# Patient Record
Sex: MALE | Race: WHITE | NOT HISPANIC OR LATINO | Employment: FULL TIME | URBAN - METROPOLITAN AREA
[De-identification: names, ages, dates, MRNs, and addresses within clinical notes are randomized per-mention and may not be internally consistent; named-entity substitution may affect disease eponyms.]

---

## 2023-11-10 ENCOUNTER — MEDICAL CORRESPONDENCE (OUTPATIENT)
Dept: HEALTH INFORMATION MANAGEMENT | Facility: CLINIC | Age: 63
End: 2023-11-10
Payer: COMMERCIAL

## 2023-11-14 ENCOUNTER — TRANSCRIBE ORDERS (OUTPATIENT)
Dept: OTHER | Age: 63
End: 2023-11-14

## 2023-11-14 DIAGNOSIS — H53.2 DOUBLE VISION: Primary | ICD-10-CM

## 2023-12-11 ENCOUNTER — OFFICE VISIT (OUTPATIENT)
Dept: OPHTHALMOLOGY | Facility: CLINIC | Age: 63
End: 2023-12-11
Attending: STUDENT IN AN ORGANIZED HEALTH CARE EDUCATION/TRAINING PROGRAM
Payer: COMMERCIAL

## 2023-12-11 DIAGNOSIS — H52.203 MYOPIA OF BOTH EYES WITH ASTIGMATISM AND PRESBYOPIA: ICD-10-CM

## 2023-12-11 DIAGNOSIS — Z96.1 PSEUDOPHAKIA, BOTH EYES: ICD-10-CM

## 2023-12-11 DIAGNOSIS — H52.4 MYOPIA OF BOTH EYES WITH ASTIGMATISM AND PRESBYOPIA: ICD-10-CM

## 2023-12-11 DIAGNOSIS — H51.11 CONVERGENCE INSUFFICIENCY: Primary | ICD-10-CM

## 2023-12-11 DIAGNOSIS — H52.13 MYOPIA OF BOTH EYES WITH ASTIGMATISM AND PRESBYOPIA: ICD-10-CM

## 2023-12-11 PROCEDURE — 92060 SENSORIMOTOR EXAMINATION: CPT | Performed by: STUDENT IN AN ORGANIZED HEALTH CARE EDUCATION/TRAINING PROGRAM

## 2023-12-11 PROCEDURE — 99214 OFFICE O/P EST MOD 30 MIN: CPT | Performed by: STUDENT IN AN ORGANIZED HEALTH CARE EDUCATION/TRAINING PROGRAM

## 2023-12-11 PROCEDURE — 92004 COMPRE OPH EXAM NEW PT 1/>: CPT | Performed by: STUDENT IN AN ORGANIZED HEALTH CARE EDUCATION/TRAINING PROGRAM

## 2023-12-11 ASSESSMENT — TONOMETRY
OS_IOP_MMHG: 10
IOP_METHOD: TONOPEN
OD_IOP_MMHG: 10

## 2023-12-11 ASSESSMENT — REFRACTION_MANIFEST
OS_ADD: +2.50
OD_ADD: +2.50
OD_AXIS: 011
OS_AXIS: 041
OD_SPHERE: PLANO
OD_CYLINDER: +0.50
OS_CYLINDER: +0.25
OS_SPHERE: -1.00

## 2023-12-11 ASSESSMENT — CONF VISUAL FIELD
OS_NORMAL: 1
OD_INFERIOR_NASAL_RESTRICTION: 0
OS_SUPERIOR_TEMPORAL_RESTRICTION: 0
OS_INFERIOR_TEMPORAL_RESTRICTION: 0
OD_NORMAL: 1
OS_INFERIOR_NASAL_RESTRICTION: 0
OS_SUPERIOR_NASAL_RESTRICTION: 0
OD_INFERIOR_TEMPORAL_RESTRICTION: 0
OD_SUPERIOR_NASAL_RESTRICTION: 0
OD_SUPERIOR_TEMPORAL_RESTRICTION: 0

## 2023-12-11 ASSESSMENT — EXTERNAL EXAM - RIGHT EYE: OD_EXAM: WNL

## 2023-12-11 ASSESSMENT — VISUAL ACUITY
METHOD: SNELLEN - LINEAR
OS_PH_SC: 20/25
OS_SC: 20/40
OD_SC: 20/20

## 2023-12-11 ASSESSMENT — CUP TO DISC RATIO
OD_RATIO: 0.1
OS_RATIO: 0.1

## 2023-12-11 ASSESSMENT — EXTERNAL EXAM - LEFT EYE: OS_EXAM: WNL

## 2023-12-11 ASSESSMENT — SLIT LAMP EXAM - LIDS
COMMENTS: WNL
COMMENTS: WNL

## 2023-12-11 NOTE — LETTER
12/11/2023       RE: Jeremiah Lerner  [address] [place] [address] 8 [facility] [address]     Dear Colleague,    Thank you for referring your patient, Jeremiah Lerner, to the [facility] [place] at [facility]. Please see a copy of my visit note below.    HPI       Diplopia Evaluation    In both eyes.  Duration of 1 year.             Comments    Pt. States that he has had intermittent double vision for the last year. Had CE/IOL LE 3 weeks ago and double vision seems to be resolved. Felt as though eyes are not fusing together. No pain or dryness BE. No flashes or floaters BE.   Pamela Delvalle COT 9:16 AM December 11, 2023             Last edited by Jojo Gregory MD on 12/12/2023  9:49 AM.          Review of systems for the eyes was negative other than the pertinent positives/negatives listed in the HPI.    Ocular Meds: nepofenac/moxfloxcain/predforte BID OS    Ocular Hx: pseudophakia OU    FOHx: no family history of glaucoma or blindness    PMHx: HLD, allergic rhinitis    MRI Head without and with Contrast 6/29/23  IMPRESSION:   1. No acute intracranial process.  No ischemia, hemorrhage, or mass.   2. Mild tortuosity/laxity of the optic nerves of uncertain significance.  The   globes and orbital structures are otherwise normal.   3. Mild chronic generalized atrophy and mild chronic white matter microvascular   disease.     Assessment & Plan     Jeremiah Lerner is a 63 year old male with the following diagnoses:    Hx of Diplopia  - unable to elicit diplopia today; EOM full OU, no fatigability, no signs of MG, no ptosis  - Per discussion with patient, left eye had diplopia and after cataract surgery several weeks ago, this has resolved; appears to be monocular in nature, though noted to have small phoria at near today, see below  - neuroimaging as above  - offered follow up with neuro-ophthalmologist here, but defers for now    Convergence Insufficiency  - noted  on exam today; denies any double vision at distance or near  - can do pencil push ups  - offered follow up with neuro-ophthalmologist here, but defers for now    Pseudophakia OU  PCO OD  - PCO not visually significant  - had CE/IOL left eye several weeks ago with local ophthalmologist and using nepofenac/moxfloxcain/predforte BID left eye and tapering according to their instructions; defer to them    Myopia of left eye with astigmatism and presbyopia  - observe    Counseled return/RD precautions  Letter to referring provider    Patient disposition:   Return if symptoms worsen or fail to improve. Patient will continue to follow up with routine eye care provider    Attending Physician Attestation:  Complete documentation of historical and exam elements from today's encounter can be found in the full encounter summary report (not reduplicated in this progress note).  I personally obtained the chief complaint(s) and history of present illness.  I confirmed and edited as necessary the review of systems, past medical/surgical history, family history, social history, and examination findings as documented by others; and I examined the patient myself.  I personally reviewed the relevant tests, images, and reports as documented above.  I formulated and edited as necessary the assessment and plan and discussed the findings and management plan with the patient and family. - Jojo Gregory MD        Again, thank you for allowing me to participate in the care of your patient.      Sincerely,    Jojo Gregory MD

## 2023-12-11 NOTE — PROGRESS NOTES
HPI       Diplopia Evaluation    In both eyes.  Duration of 1 year.             Comments    Pt. States that he has had intermittent double vision for the last year. Had CE/IOL LE 3 weeks ago and double vision seems to be resolved. Felt as though eyes are not fusing together. No pain or dryness BE. No flashes or floaters BE.   Pamela Delvalle COT 9:16 AM December 11, 2023             Last edited by Jojo Gregory MD on 12/12/2023  9:49 AM.          Review of systems for the eyes was negative other than the pertinent positives/negatives listed in the HPI.    Ocular Meds: nepofenac/moxfloxcain/predforte BID OS    Ocular Hx: pseudophakia OU    FOHx: no family history of glaucoma or blindness    PMHx: HLD, allergic rhinitis    MRI Head without and with Contrast 6/29/23  IMPRESSION:   1. No acute intracranial process.  No ischemia, hemorrhage, or mass.   2. Mild tortuosity/laxity of the optic nerves of uncertain significance.  The   globes and orbital structures are otherwise normal.   3. Mild chronic generalized atrophy and mild chronic white matter microvascular   disease.     Assessment & Plan     Jeremiah Lerner is a 63 year old male with the following diagnoses:    Hx of Diplopia  - unable to elicit diplopia today; EOM full OU, no fatigability, no signs of MG, no ptosis  - Per discussion with patient, left eye had diplopia and after cataract surgery several weeks ago, this has resolved; appears to be monocular in nature, though noted to have small phoria at near today, see below  - neuroimaging as above  - offered follow up with neuro-ophthalmologist here, but defers for now    Convergence Insufficiency  - noted on exam today; denies any double vision at distance or near  - can do pencil push ups  - offered follow up with neuro-ophthalmologist here, but defers for now    Pseudophakia OU  PCO OD  - PCO not visually significant  - had CE/IOL left eye several weeks ago with local ophthalmologist and using  nepofenac/moxfloxcain/predforte BID left eye and tapering according to their instructions; defer to them    Myopia of left eye with astigmatism and presbyopia  - observe    Counseled return/RD precautions  Letter to referring provider    Patient disposition:   Return if symptoms worsen or fail to improve. Patient will continue to follow up with routine eye care provider    Attending Physician Attestation:  Complete documentation of historical and exam elements from today's encounter can be found in the full encounter summary report (not reduplicated in this progress note).  I personally obtained the chief complaint(s) and history of present illness.  I confirmed and edited as necessary the review of systems, past medical/surgical history, family history, social history, and examination findings as documented by others; and I examined the patient myself.  I personally reviewed the relevant tests, images, and reports as documented above.  I formulated and edited as necessary the assessment and plan and discussed the findings and management plan with the patient and family. - Jojo Gregory MD

## 2023-12-11 NOTE — NURSING NOTE
Chief Complaints and History of Present Illnesses   Patient presents with    Diplopia Evaluation     Chief Complaint(s) and History of Present Illness(es)       Diplopia Evaluation              Laterality: both eyes    Duration: 1 year              Comments    Pt. States that he has had intermittent double vision for the last year. Had CE/IOL LE 3 weeks ago and double vision seems to be improving. Felt as though eyes are not fusing together. No pain or dryness BE. No flashes or floaters BE.   Pamela Delvalle COT 9:16 AM December 11, 2023

## 2023-12-12 ASSESSMENT — VISUAL ACUITY: METHOD_MR: DIAGNOSTIC MR
